# Patient Record
(demographics unavailable — no encounter records)

---

## 2025-02-06 NOTE — HISTORY OF PRESENT ILLNESS
[FreeTextEntry1] : Patient presents to establish care. She is overdue for Pap Smear and mammogram. Hx of ovarian cysts. For past 5 months has been having irregular periods. Would bleed for 2 days, stop, then resume for 2 more days. LMP 1/16/25 menses lasts 4-5 days intermittent flow

## 2025-02-13 NOTE — PROCEDURE
[Globus] : globus [Flexible Endoscope] : examined with the flexible endoscope [True Vocal Cords Erythematous] : bilateral true vocal cord edema [Normal] : posterior cricoid area had healthy pink mucosa in the interarytenoid area and the esophageal inlet

## 2025-02-13 NOTE — HISTORY OF PRESENT ILLNESS
[de-identified] : 47-year-old patient presents for initial evaluation for multinodular goiter. States that she has nodules. First noticed them 2 years ago. Now have doubled in size. Denies trouble swallowing or eating. Increased pressure at times. Complaining of cough, hx gastric bypass, took famotidine in the past. Not sure if this is due to nodules. US Thyroid 1/23/25. Hx biopsy in 2022 which was normal.

## 2025-02-13 NOTE — DATA REVIEWED
[de-identified] : US Thyroid The gland is heterogenous with multiple small nodules.  The right lobe measure 6.3 cm x 2.5 cm x 1.9 cm.   Right nodule 1: 3.2 cm x 1.5 cmx 1.8 cm in the upper pole.  Right Nodule 2: 1.8 cm x 0.8 cm x 1.1 cm in mid pole.  TI -RADS for the nodule.  TR 1.

## 2025-02-21 NOTE — REVIEW OF SYSTEMS
[Fatigue] : fatigue [Abdominal Pain] : abdominal pain [Dysmenorrhea/Abn Vaginal Bleeding] : dysmenorrhea/abnormal vaginal bleeding [Joint Pain] : joint pain [Joint Stiffness] : joint stiffness [Negative] : Allergic/Immunologic [FreeTextEntry2] : Pica Ice [FreeTextEntry8] : heavy menstrual cycle  [FreeTextEntry9] : wrist  [de-identified] : Restless leg syndrome

## 2025-02-21 NOTE — ASSESSMENT
[FreeTextEntry1] :  46 yo F presents to the office today for initial consultation for Anemia. Referred by PCP . Pt has a PSHx gastric sleeve. She has been told she was Anemic in the past. She was first told she was anemic in her teenage years. She just came for [place] about 1 year ago and is establishing acre with doctors here now. She had taken PO Iron before but is not absorbing because of GASTRIC SLEEVE. She had a BLT before in the past about 3 years ago, with Iron Infusion in [place]. Menstrual cycle- irregular, she can get it twice a month sometimes, last about 7-11 days, 4-5 days heavy. Diet- eats some green vegetables , red meats some time. Pt states fatigue, restless leg syndrome, Pica Ice.    Impression: Anemia    Plan: Previous chart and previous labs reviewed. Labs on 1/10/25 reviewed and discussed with patient. WBC 11.3, Hgb 11.3, Hct 37.1, , MCV 78. --- Today ordered labs: CBC, Ferritin, TIBC, B12, Folate, MMA --- Labs to be done 3 days prior when they RTC: CBC, Ferritin, TIBC --- Schedule for Iron Infusion Venofer 200mg IV weekly x3 --- Will call pt with lab results  --- Patient educated on High Iron rich diet  --- Pt advised to follow up with their OBGYN, PCP, GI as directed We explained possible side effect from Iron infusion is not limited to anaphylactic reaction, headache, hives, itching wheezing, difficulty breathing, a lightheaded feeling, swelling of face, lips, tongue or throat, delay reaction and abdominal discomfort. All questions were answered   RTC in 3 months with CONSUELO Buck

## 2025-02-21 NOTE — HISTORY OF PRESENT ILLNESS
[Disease:__________________________] : Disease: [unfilled] [de-identified] : 46 yo F presents to the office today for initial consultation for Anemia. Referred by PCP  . Pt has a PMHx of MI, RA Pt has a PSHx Cholecystectomy, gastric sleeve, Heart Stent  Pt has a FHx of Mother Colon CA, COPD, HTN Father DM Heart Dz, Maternal GFA Leukemia  Social History: socially drinker, non-smoker, , 2 children, Medical Billing   She has been told she was Anemic in the past  She was first told she was anemic in her teenage years She just came for [place] about 1 year ago and is establishing acre with doctors here now  She had taken PO Iron before but is not absorbing because of GASTRIC SLEEVE  She had a BLT before in the past about 3 years ago, with Iron Infusion in [place]  Menstrual cycle- irregular, she can get it twice a month sometimes, last about 7-11 days, 4-5 days heavy  Medication- None  Allergies- PCN (anaphylactic)  Diet- eats some green vegables, red meats some time     Pt states fatigue, restless leg syndrome, Pica Ice  Pt denies fever, diarrhea, chills, nausea, vomiting, SOB, dyspnea, unintentional weight loss or bleeding. Pt denies palpitations, headache, weakness, dizziness.  Pt has not seen any blood in the stools or melena. No epistaxis, hematemesis or hemoptysis.   Healthcare Maintenace: PCP- Dr. Jose BURK- Dr. Gordon  GI- NP Alfred  Colonoscopy- is due on 3/25/25  Upper Endoscopy- is due tomorrow, had one 10+ years ago  Mammography- 25 WNL  GYN Pelvic Exam/pap- 2025 Breast Exam- 2025, self  LMP- 2025  GPA- A2 (1  1 miscarriage)    Labs on 1/10/25 reviewed and discussed with patient. WBC 11.3, Hgb 11.3, Hct 37.1, , MCV 78

## 2025-02-21 NOTE — HISTORY OF PRESENT ILLNESS
[Disease:__________________________] : Disease: [unfilled] [de-identified] : 48 yo F presents to the office today for initial consultation for Anemia. Referred by PCP  . Pt has a PMHx of MI, RA Pt has a PSHx Cholecystectomy, gastric sleeve, Heart Stent  Pt has a FHx of Mother Colon CA, COPD, HTN Father DM Heart Dz, Maternal GFA Leukemia  Social History: socially drinker, non-smoker, , 2 children, Medical Billing   She has been told she was Anemic in the past  She was first told she was anemic in her teenage years She just came for [place] about 1 year ago and is establishing acre with doctors here now  She had taken PO Iron before but is not absorbing because of GASTRIC SLEEVE  She had a BLT before in the past about 3 years ago, with Iron Infusion in [place]  Menstrual cycle- irregular, she can get it twice a month sometimes, last about 7-11 days, 4-5 days heavy  Medication- None  Allergies- PCN (anaphylactic)  Diet- eats some green vegables, red meats some time     Pt states fatigue, restless leg syndrome, Pica Ice  Pt denies fever, diarrhea, chills, nausea, vomiting, SOB, dyspnea, unintentional weight loss or bleeding. Pt denies palpitations, headache, weakness, dizziness.  Pt has not seen any blood in the stools or melena. No epistaxis, hematemesis or hemoptysis.   Healthcare Maintenace: PCP- Dr. Jose BURK- Dr. Gordon  GI- NP Alfred  Colonoscopy- is due on 3/25/25  Upper Endoscopy- is due tomorrow, had one 10+ years ago  Mammography- 25 WNL  GYN Pelvic Exam/pap- 2025 Breast Exam- 2025, self  LMP- 2025  GPA- A2 (1  1 miscarriage)    Labs on 1/10/25 reviewed and discussed with patient. WBC 11.3, Hgb 11.3, Hct 37.1, , MCV 78

## 2025-02-21 NOTE — REVIEW OF SYSTEMS
[Fatigue] : fatigue [Abdominal Pain] : abdominal pain [Dysmenorrhea/Abn Vaginal Bleeding] : dysmenorrhea/abnormal vaginal bleeding [Joint Pain] : joint pain [Joint Stiffness] : joint stiffness [Negative] : Allergic/Immunologic [FreeTextEntry2] : Pica Ice [FreeTextEntry8] : heavy menstrual cycle  [FreeTextEntry9] : wrist  [de-identified] : Restless leg syndrome

## 2025-02-21 NOTE — REVIEW OF SYSTEMS
[Fatigue] : fatigue [Abdominal Pain] : abdominal pain [Dysmenorrhea/Abn Vaginal Bleeding] : dysmenorrhea/abnormal vaginal bleeding [Joint Pain] : joint pain [Joint Stiffness] : joint stiffness [Negative] : Allergic/Immunologic [FreeTextEntry2] : Pica Ice [FreeTextEntry8] : heavy menstrual cycle  [FreeTextEntry9] : wrist  [de-identified] : Restless leg syndrome

## 2025-02-21 NOTE — BEGINNING OF VISIT
[0] : 2) Feeling down, depressed, or hopeless: Not at all (0) [PHQ-2 Negative] : PHQ-2 Negative [Pain Scale: ___] : On a scale of 1-10, today the patient's pain is a(n) [unfilled]. [Never] : Never [Patient/Caregiver unclear of wishes] : Patient/Caregiver unclear of wishes [Date Discussed (MM/DD/YY): ___] : Discussed: [unfilled]

## 2025-02-21 NOTE — HISTORY OF PRESENT ILLNESS
[Disease:__________________________] : Disease: [unfilled] [de-identified] : 46 yo F presents to the office today for initial consultation for Anemia. Referred by PCP  . Pt has a PMHx of MI, RA Pt has a PSHx Cholecystectomy, gastric sleeve, Heart Stent  Pt has a FHx of Mother Colon CA, COPD, HTN Father DM Heart Dz, Maternal GFA Leukemia  Social History: socially drinker, non-smoker, , 2 children, Medical Billing   She has been told she was Anemic in the past  She was first told she was anemic in her teenage years She just came for [place] about 1 year ago and is establishing acre with doctors here now  She had taken PO Iron before but is not absorbing because of GASTRIC SLEEVE  She had a BLT before in the past about 3 years ago, with Iron Infusion in [place]  Menstrual cycle- irregular, she can get it twice a month sometimes, last about 7-11 days, 4-5 days heavy  Medication- None  Allergies- PCN (anaphylactic)  Diet- eats some green vegables, red meats some time     Pt states fatigue, restless leg syndrome, Pica Ice  Pt denies fever, diarrhea, chills, nausea, vomiting, SOB, dyspnea, unintentional weight loss or bleeding. Pt denies palpitations, headache, weakness, dizziness.  Pt has not seen any blood in the stools or melena. No epistaxis, hematemesis or hemoptysis.   Healthcare Maintenace: PCP- Dr. Jose BURK- Dr. Gordon  GI- NP Alfred  Colonoscopy- is due on 3/25/25  Upper Endoscopy- is due tomorrow, had one 10+ years ago  Mammography- 25 WNL  GYN Pelvic Exam/pap- 2025 Breast Exam- 2025, self  LMP- 2025  GPA- A2 (1  1 miscarriage)    Labs on 1/10/25 reviewed and discussed with patient. WBC 11.3, Hgb 11.3, Hct 37.1, , MCV 78

## 2025-02-21 NOTE — ASSESSMENT
[FreeTextEntry1] :  48 yo F presents to the office today for initial consultation for Anemia. Referred by PCP . Pt has a PSHx gastric sleeve. She has been told she was Anemic in the past. She was first told she was anemic in her teenage years. She just came for [place] about 1 year ago and is establishing acre with doctors here now. She had taken PO Iron before but is not absorbing because of GASTRIC SLEEVE. She had a BLT before in the past about 3 years ago, with Iron Infusion in [place]. Menstrual cycle- irregular, she can get it twice a month sometimes, last about 7-11 days, 4-5 days heavy. Diet- eats some green vegetables , red meats some time. Pt states fatigue, restless leg syndrome, Pica Ice.    Impression: Anemia    Plan: Previous chart and previous labs reviewed. Labs on 1/10/25 reviewed and discussed with patient. WBC 11.3, Hgb 11.3, Hct 37.1, , MCV 78. --- Today ordered labs: CBC, Ferritin, TIBC, B12, Folate, MMA --- Labs to be done 3 days prior when they RTC: CBC, Ferritin, TIBC --- Schedule for Iron Infusion Venofer 200mg IV weekly x3 --- Will call pt with lab results  --- Patient educated on High Iron rich diet  --- Pt advised to follow up with their OBGYN, PCP, GI as directed We explained possible side effect from Iron infusion is not limited to anaphylactic reaction, headache, hives, itching wheezing, difficulty breathing, a lightheaded feeling, swelling of face, lips, tongue or throat, delay reaction and abdominal discomfort. All questions were answered   RTC in 3 months with CONSUELO Buck

## 2025-02-21 NOTE — REVIEW OF SYSTEMS
[Fatigue] : fatigue [Abdominal Pain] : abdominal pain [Dysmenorrhea/Abn Vaginal Bleeding] : dysmenorrhea/abnormal vaginal bleeding [Joint Pain] : joint pain [Joint Stiffness] : joint stiffness [Negative] : Allergic/Immunologic [FreeTextEntry2] : Pica Ice [FreeTextEntry8] : heavy menstrual cycle  [FreeTextEntry9] : wrist  [de-identified] : Restless leg syndrome

## 2025-02-21 NOTE — HISTORY OF PRESENT ILLNESS
[Disease:__________________________] : Disease: [unfilled] [de-identified] : 46 yo F presents to the office today for initial consultation for Anemia. Referred by PCP  . Pt has a PMHx of MI, RA Pt has a PSHx Cholecystectomy, gastric sleeve, Heart Stent  Pt has a FHx of Mother Colon CA, COPD, HTN Father DM Heart Dz, Maternal GFA Leukemia  Social History: socially drinker, non-smoker, , 2 children, Medical Billing   She has been told she was Anemic in the past  She was first told she was anemic in her teenage years She just came for [place] about 1 year ago and is establishing acre with doctors here now  She had taken PO Iron before but is not absorbing because of GASTRIC SLEEVE  She had a BLT before in the past about 3 years ago, with Iron Infusion in [place]  Menstrual cycle- irregular, she can get it twice a month sometimes, last about 7-11 days, 4-5 days heavy  Medication- None  Allergies- PCN (anaphylactic)  Diet- eats some green vegables, red meats some time     Pt states fatigue, restless leg syndrome, Pica Ice  Pt denies fever, diarrhea, chills, nausea, vomiting, SOB, dyspnea, unintentional weight loss or bleeding. Pt denies palpitations, headache, weakness, dizziness.  Pt has not seen any blood in the stools or melena. No epistaxis, hematemesis or hemoptysis.   Healthcare Maintenace: PCP- Dr. Jose BURK- Dr. Gordon  GI- NP Alfred  Colonoscopy- is due on 3/25/25  Upper Endoscopy- is due tomorrow, had one 10+ years ago  Mammography- 25 WNL  GYN Pelvic Exam/pap- 2025 Breast Exam- 2025, self  LMP- 2025  GPA- A2 (1  1 miscarriage)    Labs on 1/10/25 reviewed and discussed with patient. WBC 11.3, Hgb 11.3, Hct 37.1, , MCV 78

## 2025-05-14 NOTE — HISTORY OF PRESENT ILLNESS
[FreeTextEntry1] : Pt returns to office after completing biopsy.  Thyroseq positive on the right mid thyroid nodule.  Having throat soreness intermittently.

## 2025-05-23 NOTE — REVIEW OF SYSTEMS
[Fatigue] : fatigue [Abdominal Pain] : abdominal pain [Dysmenorrhea/Abn Vaginal Bleeding] : dysmenorrhea/abnormal vaginal bleeding [Joint Pain] : joint pain [Joint Stiffness] : joint stiffness [FreeTextEntry2] : Pica Ice [Negative] : Gastrointestinal [FreeTextEntry8] : heavy menstrual cycle  [FreeTextEntry9] : wrist  [de-identified] : Mild restless leg syndrome

## 2025-05-23 NOTE — HISTORY OF PRESENT ILLNESS
[Disease:__________________________] : Disease: [unfilled] [de-identified] : 46 yo F presents to the office today for initial consultation for Anemia. Referred by PCP  . Pt has a PMHx of MI, RA Pt has a PSHx Cholecystectomy, gastric sleeve, Heart Stent  Pt has a FHx of Mother Colon CA, COPD, HTN Father DM Heart Dz, Maternal GFA Leukemia  Social History: socially drinker, non-smoker, , 2 children, Medical Billing   She has been told she was Anemic in the past  She was first told she was anemic in her teenage years She just came for [place] about 1 year ago and is establishing acre with doctors here now  She had taken PO Iron before but is not absorbing because of GASTRIC SLEEVE  She had a BLT before in the past about 3 years ago, with Iron Infusion in [place]  Menstrual cycle- irregular, she can get it twice a month sometimes, last about 7-11 days, 4-5 days heavy  Medication- None  Allergies- PCN (anaphylactic)  Diet- eats some green vegables, red meats some time     Pt states fatigue, restless leg syndrome, Pica Ice  Pt denies fever, diarrhea, chills, nausea, vomiting, SOB, dyspnea, unintentional weight loss or bleeding. Pt denies palpitations, headache, weakness, dizziness.  Pt has not seen any blood in the stools or melena. No epistaxis, hematemesis or hemoptysis.   Healthcare Maintenace: PCP- Dr. Jose BURK- Dr. Gordon  GI- NP Alfred  Colonoscopy- is due on 3/25/25  Upper Endoscopy- is due tomorrow, had one 10+ years ago  Mammography- 25 WNL  GYN Pelvic Exam/pap- 2025 Breast Exam- 2025, self  LMP- 2025  GPA- A2 (1  1 miscarriage)    Labs on 1/10/25 reviewed and discussed with patient. WBC 11.3, Hgb 11.3, Hct 37.1, , MCV 78 [de-identified] : 5/22/25 48 yo F presents to the office today for follow up for Anemia s/p Venofer 200mg IV weekly x3, last dose on 3/21/25 She has a PSHx of Gastric Bypass  She states she is feeling much better She tolerated well and had no side effects or reactions LMP- 5/14/25, lasted 7 days  She had her thyroid lymph node biopsied.  On 6/16/25 she is going to have a thyroidectomy.  She is taking Blood Builder PO and states she is tolerating and making her feel well  Pt states nasal congestion, restless leg syndrome but gotten much better   Pt denies fever, diarrhea, fatigue, chills, nausea, vomiting, SOB, dyspnea, unintentional weight loss or bleeding. Pt denies palpitations, headache, weakness, dizziness, Pica.  Pt has not seen any blood in the stools or melena. No epistaxis, hematemesis or hemoptysis. Labs on 5/16/25 reviewed and discussed with patient. WBC 11.08, Hgb 13.6, Hct 40.8, , MCV 84.1 Ferritin 123, Iron Serum 25, Iron sat 8, TIBC 296, UIBC 271

## 2025-05-23 NOTE — REVIEW OF SYSTEMS
[Fatigue] : fatigue [Abdominal Pain] : abdominal pain [Dysmenorrhea/Abn Vaginal Bleeding] : dysmenorrhea/abnormal vaginal bleeding [Joint Pain] : joint pain [Joint Stiffness] : joint stiffness [FreeTextEntry2] : Pica Ice [Negative] : Gastrointestinal [FreeTextEntry8] : heavy menstrual cycle  [FreeTextEntry9] : wrist  [de-identified] : Mild restless leg syndrome

## 2025-05-23 NOTE — HISTORY OF PRESENT ILLNESS
[Disease:__________________________] : Disease: [unfilled] [de-identified] : 46 yo F presents to the office today for initial consultation for Anemia. Referred by PCP  . Pt has a PMHx of MI, RA Pt has a PSHx Cholecystectomy, gastric sleeve, Heart Stent  Pt has a FHx of Mother Colon CA, COPD, HTN Father DM Heart Dz, Maternal GFA Leukemia  Social History: socially drinker, non-smoker, , 2 children, Medical Billing   She has been told she was Anemic in the past  She was first told she was anemic in her teenage years She just came for [place] about 1 year ago and is establishing acre with doctors here now  She had taken PO Iron before but is not absorbing because of GASTRIC SLEEVE  She had a BLT before in the past about 3 years ago, with Iron Infusion in [place]  Menstrual cycle- irregular, she can get it twice a month sometimes, last about 7-11 days, 4-5 days heavy  Medication- None  Allergies- PCN (anaphylactic)  Diet- eats some green vegables, red meats some time     Pt states fatigue, restless leg syndrome, Pica Ice  Pt denies fever, diarrhea, chills, nausea, vomiting, SOB, dyspnea, unintentional weight loss or bleeding. Pt denies palpitations, headache, weakness, dizziness.  Pt has not seen any blood in the stools or melena. No epistaxis, hematemesis or hemoptysis.   Healthcare Maintenace: PCP- Dr. Jose BURK- Dr. Gordon  GI- NP Alfred  Colonoscopy- is due on 3/25/25  Upper Endoscopy- is due tomorrow, had one 10+ years ago  Mammography- 25 WNL  GYN Pelvic Exam/pap- 2025 Breast Exam- 2025, self  LMP- 2025  GPA- A2 (1  1 miscarriage)    Labs on 1/10/25 reviewed and discussed with patient. WBC 11.3, Hgb 11.3, Hct 37.1, , MCV 78 [de-identified] : 5/22/25 46 yo F presents to the office today for follow up for Anemia s/p Venofer 200mg IV weekly x3, last dose on 3/21/25 She has a PSHx of Gastric Bypass  She states she is feeling much better She tolerated well and had no side effects or reactions LMP- 5/14/25, lasted 7 days  She had her thyroid lymph node biopsied.  On 6/16/25 she is going to have a thyroidectomy.  She is taking Blood Builder PO and states she is tolerating and making her feel well  Pt states nasal congestion, restless leg syndrome but gotten much better   Pt denies fever, diarrhea, fatigue, chills, nausea, vomiting, SOB, dyspnea, unintentional weight loss or bleeding. Pt denies palpitations, headache, weakness, dizziness, Pica.  Pt has not seen any blood in the stools or melena. No epistaxis, hematemesis or hemoptysis. Labs on 5/16/25 reviewed and discussed with patient. WBC 11.08, Hgb 13.6, Hct 40.8, , MCV 84.1 Ferritin 123, Iron Serum 25, Iron sat 8, TIBC 296, UIBC 271

## 2025-05-23 NOTE — ASSESSMENT
[FreeTextEntry1] :  48 yo F presents to the office today for initial consultation for Anemia. Referred by PCP . Pt has a PSHx gastric sleeve. She has been told she was Anemic in the past. She was first told she was anemic in her teenage years. She just came for [place] about 1 year ago and is establishing acre with doctors here now. She had taken PO Iron before but is not absorbing because of GASTRIC SLEEVE. She had a BLT before in the past about 3 years ago, with Iron Infusion in [place]. Menstrual cycle- irregular, she can get it twice a month sometimes, last about 7-11 days, 4-5 days heavy. Diet- eats some green vegetables , red meats some time. Pt states fatigue, restless leg syndrome, Pica Ice.    Impression: Anemia    Plan: Previous chart and previous labs reviewed. Labs on 5/16/25 reviewed and discussed with patient. WBC 11.08, Hgb 13.6, Hct 40.8, , MCV 84.1 Ferritin 123, Iron Serum 25, Iron sat 8, TIBC 296, UIBC 271. --- Labs to be done 3 days prior when they RTC: CBC, Ferritin, TIBC, B12, Folate  --- s/p Venofer 200mg IV weekly x3, last dose on 3/21/25 --- Hgb and Iron levels stable  --- Patient will Take PO Iron Blood Builder --- Patient will follow up with Thyroid biopsy and depending on results she will continue follow up with me or one of the Oncologist  --- Patient educated on High Iron rich diet  --- Pt advised to follow up with their OBGYN, PCP, GI as directed We explained possible side effect from Iron infusion is not limited to anaphylactic reaction, headache, hives, itching wheezing, difficulty breathing, a lightheaded feeling, swelling of face, lips, tongue or throat, delay reaction and abdominal discomfort. All questions were answered   RTC in 4 months with CONSUELO Buck

## 2025-05-23 NOTE — REVIEW OF SYSTEMS
[Fatigue] : fatigue [Abdominal Pain] : abdominal pain [Dysmenorrhea/Abn Vaginal Bleeding] : dysmenorrhea/abnormal vaginal bleeding [Joint Pain] : joint pain [Joint Stiffness] : joint stiffness [FreeTextEntry2] : Pica Ice [Negative] : Gastrointestinal [FreeTextEntry8] : heavy menstrual cycle  [FreeTextEntry9] : wrist  [de-identified] : Mild restless leg syndrome

## 2025-06-23 NOTE — PHYSICAL EXAM
[Normal] : mucosa is normal [Midline] : trachea located in midline position [de-identified] : suture removed

## 2025-06-23 NOTE — HISTORY OF PRESENT ILLNESS
[FreeTextEntry1] : Pt returns today s/p total thyroidectomy 6/16/25. Reports that she is doing well. Denies pain or discomfort.  No further complaints or concerns. Voice is strong no symptoms of hypocalcemia .

## 2025-07-03 NOTE — HISTORY OF PRESENT ILLNESS
RT to [facility].  X-Ray to [facility].  PIV established and labs drawn.  Pt currently 94% on 6 liters oxygen.    Wife @ [facility] and they are asking how to get started with hospice.  ERP and case management made aware.   [FreeTextEntry1] : patient returns today for a follow up on